# Patient Record
Sex: MALE | Race: WHITE | NOT HISPANIC OR LATINO | Employment: FULL TIME | URBAN - METROPOLITAN AREA
[De-identification: names, ages, dates, MRNs, and addresses within clinical notes are randomized per-mention and may not be internally consistent; named-entity substitution may affect disease eponyms.]

---

## 2023-03-24 ENCOUNTER — OFFICE VISIT (OUTPATIENT)
Dept: FAMILY MEDICINE | Facility: CLINIC | Age: 28
End: 2023-03-24
Payer: COMMERCIAL

## 2023-03-24 VITALS
HEART RATE: 69 BPM | DIASTOLIC BLOOD PRESSURE: 81 MMHG | RESPIRATION RATE: 16 BRPM | OXYGEN SATURATION: 97 % | TEMPERATURE: 97.5 F | BODY MASS INDEX: 26.81 KG/M2 | SYSTOLIC BLOOD PRESSURE: 136 MMHG | HEIGHT: 69 IN | WEIGHT: 181 LBS

## 2023-03-24 DIAGNOSIS — H91.90 HEARING LOSS, UNSPECIFIED HEARING LOSS TYPE, UNSPECIFIED LATERALITY: ICD-10-CM

## 2023-03-24 DIAGNOSIS — Z00.00 ROUTINE GENERAL MEDICAL EXAMINATION AT A HEALTH CARE FACILITY: Primary | ICD-10-CM

## 2023-03-24 DIAGNOSIS — M25.50 MULTIPLE JOINT PAIN: ICD-10-CM

## 2023-03-24 DIAGNOSIS — G89.29 CHRONIC BILATERAL LOW BACK PAIN, UNSPECIFIED WHETHER SCIATICA PRESENT: ICD-10-CM

## 2023-03-24 DIAGNOSIS — M54.50 CHRONIC BILATERAL LOW BACK PAIN, UNSPECIFIED WHETHER SCIATICA PRESENT: ICD-10-CM

## 2023-03-24 DIAGNOSIS — R40.0 HAS DAYTIME DROWSINESS: ICD-10-CM

## 2023-03-24 PROCEDURE — 99213 OFFICE O/P EST LOW 20 MIN: CPT | Mod: 25 | Performed by: PHYSICIAN ASSISTANT

## 2023-03-24 PROCEDURE — 99385 PREV VISIT NEW AGE 18-39: CPT | Performed by: PHYSICIAN ASSISTANT

## 2023-03-24 ASSESSMENT — ENCOUNTER SYMPTOMS
HEMATOCHEZIA: 0
CHILLS: 0
COUGH: 0
HEARTBURN: 0
SHORTNESS OF BREATH: 0
FEVER: 0
NERVOUS/ANXIOUS: 0
HEMATURIA: 0
NAUSEA: 0
DIZZINESS: 0
EYE PAIN: 0
DIARRHEA: 0
DYSURIA: 0
FREQUENCY: 0
PARESTHESIAS: 0
WEAKNESS: 0
HEADACHES: 0
ARTHRALGIAS: 1
ABDOMINAL PAIN: 0
CONSTIPATION: 0
JOINT SWELLING: 0
MYALGIAS: 0
PALPITATIONS: 0
SORE THROAT: 0

## 2023-03-24 ASSESSMENT — PAIN SCALES - GENERAL: PAINLEVEL: SEVERE PAIN (6)

## 2023-03-24 NOTE — PATIENT INSTRUCTIONS
Follow up with audiology and sleep center   Follow up with orthopedics for joint pain and low back pain     Patient Education    Preventive Health Recommendations  Male Ages 26 - 39    Yearly exam:             See your health care provider every year in order to  o   Review health changes.   o   Discuss preventive care.    o   Review your medicines if your doctor has prescribed any.  You should be tested each year for STDs (sexually transmitted diseases), if you re at risk.   After age 35, talk to your provider about cholesterol testing. If you are at risk for heart disease, have your cholesterol tested at least every 5 years.   If you are at risk for diabetes, you should have a diabetes test (fasting glucose).  Shots: Get a flu shot each year. Get a tetanus shot every 10 years.     Nutrition:  Eat at least 5 servings of fruits and vegetables daily.   Eat whole-grain bread, whole-wheat pasta and brown rice instead of white grains and rice.   Get adequate Calcium and Vitamin D.     Lifestyle  Exercise for at least 150 minutes a week (30 minutes a day, 5 days a week). This will help you control your weight and prevent disease.   Limit alcohol to one drink per day.   No smoking.   Wear sunscreen to prevent skin cancer.   See your dentist every six months for an exam and cleaning.

## 2023-03-24 NOTE — PROGRESS NOTES
Answers for HPI/ROS submitted by the patient on 3/24/2023  Frequency of exercise:: 6-7 days/week  Getting at least 3 servings of Calcium per day:: Yes  Diet:: Regular (no restrictions)  Taking medications regularly:: Not Applicable  Medication side effects:: Not applicable  Bi-annual eye exam:: Yes  Dental care twice a year:: Yes  Sleep apnea or symptoms of sleep apnea:: Daytime drowsiness  abdominal pain: No  Blood in stool: No  Blood in urine: No  chest pain: No  chills: No  congestion: No  constipation: No  cough: No  diarrhea: No  dizziness: No  ear pain: No  eye pain: No  nervous/anxious: No  fever: No  frequency: No  genital sores: No  headaches: No  hearing loss: Yes  heartburn: No  arthralgias: Yes  joint swelling: No  peripheral edema: No  mood changes: No  myalgias: No  nausea: No  dysuria: No  palpitations: No  Skin sensation changes: No  sore throat: No  urgency: No  rash: No  shortness of breath: No  visual disturbance: No  weakness: No  Additional concerns today:: No  Duration of exercise:: Greater than 60 minutes
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
## 2023-04-12 ENCOUNTER — MYC MEDICAL ADVICE (OUTPATIENT)
Dept: ORTHOPEDICS | Facility: CLINIC | Age: 28
End: 2023-04-12
Payer: COMMERCIAL

## 2023-04-12 ENCOUNTER — TELEPHONE (OUTPATIENT)
Dept: ORTHOPEDICS | Facility: CLINIC | Age: 28
End: 2023-04-12
Payer: COMMERCIAL

## 2023-04-12 NOTE — TELEPHONE ENCOUNTER
Call went straight to voicemail. Asked patient to return call or reply to [email or web address] message to discuss what we are able to evaluate tomorrow.     If patient returns call we are unable to see for back and neck pain, as our sports medicine providers do not treat chronic spine conditions. Would recommend he reach out to his PCP for a referral to spine.     Need more information on location of hip pain and when hip pain occurs. [facility].     Will send patient follow up [employer] message.     Alexandrea Montano MSA, ATC  Certified Athletic Trainer

## 2023-04-12 NOTE — TELEPHONE ENCOUNTER
Left voicemail for patient to return call to discuss what he is wanting to be seen for tomorrow. Follow up message sent via [employer].    Patient noted appointment for back, neck, hips, knees and shoulders. If patient calls back please clarify what his primary concern is as we generally focus on 1-2 joints per visit. He stated in scheduling appointment that it was not for his back, but then indicated it in the appointment note. Need additional clarification on length of back pain, as sports medicine is not appropriate for chronic back and neck pain.        Alexandrea Montano MSA, ATC  Certified Athletic Trainer

## 2023-04-12 NOTE — TELEPHONE ENCOUNTER
Message left for patient asking for call back or reply to [employer] message to address what we are able to evaluate tomorrow. Will try contacting patient in the morning.     Alexandrea Montano MSA, ATC  Certified Athletic Trainer

## 2023-04-12 NOTE — TELEPHONE ENCOUNTER
Patient called back- I relayed info below. His number one issues is hips, [facility], no prior surgery    Back & Neck pain has been for ~ 7 years and not sports realated    Shoulders, no imaging, no prior surgery  Knees, no imaging, no prior surgery

## 2023-04-13 ENCOUNTER — ANCILLARY PROCEDURE (OUTPATIENT)
Dept: GENERAL RADIOLOGY | Facility: CLINIC | Age: 28
End: 2023-04-13
Attending: FAMILY MEDICINE
Payer: COMMERCIAL

## 2023-04-13 ENCOUNTER — OFFICE VISIT (OUTPATIENT)
Dept: ORTHOPEDICS | Facility: CLINIC | Age: 28
End: 2023-04-13
Payer: COMMERCIAL

## 2023-04-13 VITALS
HEIGHT: 70 IN | WEIGHT: 186.6 LBS | BODY MASS INDEX: 26.71 KG/M2 | DIASTOLIC BLOOD PRESSURE: 93 MMHG | SYSTOLIC BLOOD PRESSURE: 136 MMHG

## 2023-04-13 DIAGNOSIS — M25.561 CHRONIC PAIN OF BOTH KNEES: Primary | ICD-10-CM

## 2023-04-13 DIAGNOSIS — M54.50 LUMBOSACRAL PAIN, CHRONIC: ICD-10-CM

## 2023-04-13 DIAGNOSIS — M25.50 MULTIPLE JOINT PAIN: ICD-10-CM

## 2023-04-13 DIAGNOSIS — M25.562 CHRONIC PAIN OF BOTH KNEES: Primary | ICD-10-CM

## 2023-04-13 DIAGNOSIS — M25.561 BILATERAL KNEE PAIN: ICD-10-CM

## 2023-04-13 DIAGNOSIS — M54.50 CHRONIC BILATERAL LOW BACK PAIN, UNSPECIFIED WHETHER SCIATICA PRESENT: ICD-10-CM

## 2023-04-13 DIAGNOSIS — M25.562 BILATERAL KNEE PAIN: ICD-10-CM

## 2023-04-13 DIAGNOSIS — G89.29 LUMBOSACRAL PAIN, CHRONIC: ICD-10-CM

## 2023-04-13 DIAGNOSIS — M25.551 BILATERAL HIP PAIN: ICD-10-CM

## 2023-04-13 DIAGNOSIS — M25.552 BILATERAL HIP PAIN: ICD-10-CM

## 2023-04-13 DIAGNOSIS — G89.29 CHRONIC BILATERAL LOW BACK PAIN, UNSPECIFIED WHETHER SCIATICA PRESENT: ICD-10-CM

## 2023-04-13 DIAGNOSIS — G89.29 CHRONIC PAIN OF BOTH KNEES: Primary | ICD-10-CM

## 2023-04-13 PROCEDURE — 72170 X-RAY EXAM OF PELVIS: CPT | Mod: TC | Performed by: RADIOLOGY

## 2023-04-13 PROCEDURE — 73562 X-RAY EXAM OF KNEE 3: CPT | Mod: TC | Performed by: RADIOLOGY

## 2023-04-13 PROCEDURE — 99204 OFFICE O/P NEW MOD 45 MIN: CPT | Performed by: FAMILY MEDICINE

## 2023-04-13 NOTE — TELEPHONE ENCOUNTER
Patient seen in clinic. Closing encounter.    Alexandrea Montano MSA, ATC  Certified Athletic Trainer

## 2023-04-13 NOTE — PATIENT INSTRUCTIONS
Thank you for choosing [facility]    [facility] LOCATIONS  [place] [address] [address] [facility] [facility], [address]th [place], [address] [place] [address]   [phone]       APPOINTMENTS: [phone]    CARE QUESTIONS: [phone],    BILLING QUESTIONS: [phone]    FAX NUMBER: [phone]        Follow up: once you know the date of your MRI please call [phone] to schedule a 40 minute follow up appointment to review the results, and for evaluation of your shoulder pain.       1. Chronic pain of both knees    2. Multiple joint pain    3. Chronic bilateral low back pain, unspecified whether sciatica present    4. Bilateral hip pain    5. Lumbosacral pain, chronic        An order for an MRI was placed today. You may call directly to schedule at [phone] at your convenience.   .

## 2023-04-13 NOTE — TELEPHONE ENCOUNTER
Phone again goes straight to voicemail. Will reach out via [facility] [employer]. Did provide call back number.     Alexandrea Montano MSA, ATC  Certified Athletic Trainer
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
## 2023-04-13 NOTE — TELEPHONE ENCOUNTER
Routing to appropriate pool to please review and advise, thank you!    Danae Briones RN  [facility]

## 2023-04-28 ENCOUNTER — ANCILLARY PROCEDURE (OUTPATIENT)
Dept: MRI IMAGING | Facility: CLINIC | Age: 28
End: 2023-04-28
Attending: FAMILY MEDICINE
Payer: COMMERCIAL

## 2023-04-28 DIAGNOSIS — G89.29 CHRONIC PAIN OF BOTH KNEES: ICD-10-CM

## 2023-04-28 DIAGNOSIS — M25.561 CHRONIC PAIN OF BOTH KNEES: ICD-10-CM

## 2023-04-28 DIAGNOSIS — M25.562 CHRONIC PAIN OF BOTH KNEES: ICD-10-CM

## 2023-04-28 PROCEDURE — 73721 MRI JNT OF LWR EXTRE W/O DYE: CPT | Mod: 50
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
## 2023-05-11 ENCOUNTER — OFFICE VISIT (OUTPATIENT)
Dept: PHYSICAL MEDICINE AND REHAB | Facility: CLINIC | Age: 28
End: 2023-05-11
Attending: FAMILY MEDICINE
Payer: COMMERCIAL

## 2023-05-11 VITALS
BODY MASS INDEX: 26.66 KG/M2 | DIASTOLIC BLOOD PRESSURE: 73 MMHG | HEART RATE: 67 BPM | WEIGHT: 185.8 LBS | SYSTOLIC BLOOD PRESSURE: 111 MMHG | OXYGEN SATURATION: 96 %

## 2023-05-11 DIAGNOSIS — M54.50 LUMBOSACRAL PAIN, CHRONIC: ICD-10-CM

## 2023-05-11 DIAGNOSIS — M53.3 SACROILIAC JOINT PAIN: ICD-10-CM

## 2023-05-11 DIAGNOSIS — G89.29 LUMBOSACRAL PAIN, CHRONIC: ICD-10-CM

## 2023-05-11 DIAGNOSIS — M53.3 SACROILIAC JOINT PAIN: Primary | ICD-10-CM

## 2023-05-11 DIAGNOSIS — M54.50 CHRONIC BILATERAL LOW BACK PAIN WITHOUT SCIATICA: Primary | ICD-10-CM

## 2023-05-11 DIAGNOSIS — G89.29 CHRONIC BILATERAL LOW BACK PAIN WITHOUT SCIATICA: Primary | ICD-10-CM

## 2023-05-11 PROCEDURE — 99205 OFFICE O/P NEW HI 60 MIN: CPT | Performed by: NURSE PRACTITIONER

## 2023-05-11 ASSESSMENT — PAIN SCALES - GENERAL: PAINLEVEL: SEVERE PAIN (6)

## 2023-05-11 NOTE — LETTER
[facility] PHYSICAL MEDICINE AND REHABILITATION CLINIC [place] [address] [address]  Phone: [phone]  Fax: [phone]    May 11, 2023        Eb León  [address] [address] [place] [id number]          To whom it may concern:    RE: Eb León was seen in our clinic today.  I would recommend that he use a standing desk for his work.    Please contact me for questions or concerns.      Sincerely,        Kiley Keith NP  Physical Medicine and Rehabilitation, Medical Spine  PM&R clinic Phone: [phone]  PM&R clinic Fax: [phone]

## 2023-05-11 NOTE — PATIENT INSTRUCTIONS
It was a pleasure seeing you in the clinic today.  As discussed, we made the following updates to your plan of care:       An XR of your lumbar spine order was added today.  Radiology will call you to schedule. You may also call [facility] Imaging Scheduling directly if you do not hear from them in the next couple of days.    Imaging scheduling:  [facility] [phone] Clinics and Surgery Center [facility]), [facility] Clinics, including [facility], [facility] [phone] [facility], [facility] Clinics including [place], [place], [place], [place], and [facility] [phone] [facility], [facility], [facility], [facility] clinics, including [place], [facility], and [facility] [phone] [facility], [facility], [facility] Clinics, including [place], [facility], [facility], and [place]     An order for physical therapy was added today. Physical therapy schedulers should call you in the next few days to schedule an appointment. You may also call [phone] to arrange an appointment at any of the [facility] outpatient physical therapy locations.  It will be very important for you to do the physical therapy exercises on a regular basis to decrease your pain and prevent future flares of pain.     I will add the order for bilateral SI joint injections with Dr Day.  Our schedulers will reach out to you to arrange that appointment.    I added a work letter for a standing desk which you should be able to access through [facility].          Thank you,  Kiley Ketih NP  Physical Medicine and Rehabilitation, Medical Spine  PM&R clinic Phone: [phone]  PM&R clinic Fax: [phone]

## 2023-05-11 NOTE — LETTER
"5/11/2023       RE: Eb León  [id number] [address] [place] [id number]       Dear Colleague,    Thank you for referring your patient, Eb León, to the [facility] PHYSICAL MEDICINE AND REHABILITATION CLINIC [place] at [facility]. Please see a copy of my visit note below.    Patient seen at the request of Pedro Ortega for an opinion and evaluation of chronic low back pain.      HISTORY OF PRESENT ILLNESS:  Eb León is a 27 year old male who presents with a chief complaint of chronic low back pain.     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  The first accident happened in [place] in 2017 when he was helping to unload cargo off of a trailer truck.  He had a misstep and fell off the trailer.  He had a similar incident falling off of a trailer truck from a 4-5 foot height in 2020 when [place] was [place] in [place].     He says that he [facility] about [facility].  He said that they had discussed surgical intervention however he says no imaging was ever done.  He says that he did do some physical therapy in 2019 when he was stationed working in [place].    He reports some pain at the low back \"where the pelvis and spine meet\".  He reports a pinching, throbbing, constant pain.  The pain extends up his spine.  He says he cannot tell if it is more of the spine or muscles around it that are bothering him. He denies any pain, numbness or tingling in his legs or feet.    The pain has been present since his initial fall in 2017.  The pain flared up after the second fall in 2020.  The pain is fairly constant day-to-day. Today he rates the pain 6/10.  At its worst it is 8 8-9/10.  Heat and ibuprofen helps the pain temporarily.  He also tried cold, stretching, and strengthening.  He says that he loves golfing and exercise but golf and twisting movements flares up the pain.     He denies falls, " weakness, bowel or bladder incontinence, saddle anesthesia, unintentional weight loss, fevers/chills, or drenching night sweats.    He did see Dr. Ortega back in sports medicine.  He says that they discussed possibly using a brace for his right knee during activity and potentially an injection for the right knee.  He says he is holding off on that for right now because he would like to address his back first.      PRIOR INJURIES/TREATMENT:   Ice/Heat: heat, helps temporarily    Brace: no    Physical Therapy:   PT last was in [place] end of 2019      - Current Pain Medications -   Ibuprofen as needed      - Prior/Trialed Pain Medications -   biofreeze  KT tape    Prior Procedures:  Date    Procedure   Improvement (%)  none              Prior Related Surgery: none    Other (acupuncture, OMT, CMM, TENS, DME, etc.):   Chiropractor in the past helped with the hip pain    Specialists Seen - (with most recent, available notes and clinic visits reviewed)   1. Sports medicine-Dr. Ortega    IMAGING - reviewed   MR KNEE LEFT W/O CONTRAST  4/28/2023   IMPRESSION:  1.  Questionable medial meniscal surface irregularity and intrasubstance signal, is seen on a single sequence but on none of the other sequences. This is favored to be artifact. However, as some meniscal tears can be subtle on MRI, I would recommend   correlation with medial meniscal stress testing and/or symptomatology. If clinical findings are equivocal, or if it would otherwise , MRI or CT arthrography could be considered for further sensitivity/specificity.     2.  No other significant intra-articular pathology. The cartilage surfaces are intact and well preserved. The lateral meniscus is normal.     3.  Moderate lateral patellar tilting might predispose to patellofemoral maltracking. However, the patellofemoral cartilage is normal. Mild proximal patellar tendinosis and peritendinous edema might correlate with symptoms of tendinitis.     4.  Mild  distal semimembranosus tendinosis without tearing.     5.  Otherwise normal left knee.    MR KNEE RIGHT W/O CONTRAST 4/28/2023   IMPRESSION:  1.  Mild proximal patellar tendinosis, without tearing or significant peritendinous edema.     2.  Mild lateral patellar tilting, might predispose to patellofemoral maltracking. Patellofemoral cartilage is intact and is normal.     3.  Mild distal semimembranosus tendinosis without tearing or acute tendinitis.     4.  Otherwise normal right knee. The menisci and cartilage surfaces are intact. Ligaments and myotendinous structures are otherwise unremarkable.    PELVIS ONE TO TWO VIEWS   4/13/2023                                                                 IMPRESSION: No acute displaced pelvic or proximal femoral fracture  identified. Normal and symmetric hip joint spaces. Diminished  bilateral femoral head-neck offset. Both obturator rings are intact.  Symmetric SI joints.      Review Of Systems:  I am responding to those symptoms which are directly relevant to the specific indication for my consultation. I recommend that the patient follow up with their primary or referring provider to pursue any other symptoms which may be of concern.       Medical History:  Chronic back pain    He  has a past surgical history that includes no history of surgery.    Family History  His family history includes Breast Cancer in his maternal aunt; Cancer in his maternal grandmother, paternal grandfather, and paternal grandmother; Diabetes in his father.     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  No difficulty with ADLs/IADLs  He  reports that he has never smoked. He has never been exposed to tobacco smoke. He has never used smokeless tobacco. He reports current alcohol use. He reports that he does not use drugs.        Current Medications:   He currently has no medications in their medication list.     Allergies:   No Known  Allergies    PHYSICAL EXAMINATION:  There were no vitals taken for this visit.   --CONSTITUTIONAL: Vital signs as above. No acute distress. The patient is well nourished and well groomed.  --PSYCHIATRIC: The patient is awake, alert, oriented to person, place, time and answering questions appropriately with clear speech. Appropriate mood and affect   --SKIN:  Posterior torso is clean, dry, intact without rashes.   --RESPIRATORY: Normal rhythm and effort. No abnormal accessory muscle breathing patterns noted.   --GROSS MOTOR: Easily arises from a seated position. Toe walking and heel walking are normal.  Heel-to-toe walking is normal.  --STANDING EXAMINATION: Gait is non-antalgic. Normal lumbar lordosis noted, no lateral shift.  --LOWER EXTREMITY MOTOR TESTING:  Plantar flexion left 5/5, right 5/5   Dorsiflexion left 5/5, right 5/5   Great toe MTP extension left 5/5, right 5/5  Knee flexion left 5/5, right 5/5  Knee extension left 5/5, right 5/5   Hip flexion left 5/5, right 5/5  Ankle eversion and inversion left 5/5, right 5/5  --MUSCULOSKELETAL: Lumbar spine inspection reveals no evidence of deformity. Range of motion is not limited in lumbar flexion, extension, lateral rotation.  Lateral rotation elicits pain.  No tenderness to palpation lumbar spine. Straight leg raise in the supine position is negative to radicular pain.  Facet loading maneuvers are positive bilaterally.  Pain with palpation of the midline spine, of the whole lumbar spine especially more so at L4/L5 area.   There is paraspinal muscular fullness.  --SACROILIAC JOINT: Will positive.  There is pain with SI joint palpation left more than right. Negative distraction.  Aaron's positive bilaterally.  Positive Gaenslen's Test.  Sacroiliac Joint Compression Test negative. Sacral Thrust/Yeoman's Test positive.  --HIPS: Full range of motion bilaterally.  Mild pain with logrolling bilaterally.     --NEUROLOGIC: 2/4 patellar, medial hamstring, and achilles  reflexes bilaterally.  Sensation to light touch is intact in the bilateral L4, L5, and S1 dermatomes. No clonus.    --VASCULAR:  Warm lower limbs bilaterally. There is no pitting edema of the bilateral lower extremities.     ASSESSMENT:  Eb León is a pleasant 27 year old male who presents with chronic low back pain.  Physical exam with positive SI joint maneuvers, facet loading maneuvers, and hip log rolling.  His strength and reflexes are normal.  I suspect that his pain is related to some combination of SI joint and axial/mechanical low back pain, possible hip pain.      PELVIS XR  4/13/2023 shows no acute fracture or dislocation. Does show diminished  bilateral femoral head-neck offset. Symmetric SI joints.    PLAN:  -Imaging reviewed in detail  -Add x-ray of the lumbar spine  -Add order for physical therapy  -Letter provided for standing desk  -We discussed medication management such as muscle relaxer for the muscle spasm component of his pain but he prefers to hold off on any medications at this point  -Refer for bilateral SI joint injection with Dr. Day  - RTC for SI joint injection with Dr. Day    Ready to learn, no apparent learning barriers.  Education provided on treatment plan according to patient's preferred learning style.  Patient verbalizes understanding.   __________________________________      60 minutes spent by me on the date of the encounter doing chart review, history and exam, documentation and further activities per the note         Again, thank you for allowing me to participate in the care of your patient.      Sincerely,    MAY Morrow CNP

## 2023-05-12 ENCOUNTER — TELEPHONE (OUTPATIENT)
Dept: PHYSICAL MEDICINE AND REHAB | Facility: CLINIC | Age: 28
End: 2023-05-12
Payer: COMMERCIAL

## 2023-05-12 NOTE — TELEPHONE ENCOUNTER
Left voicemail for patient regarding scheduling surgery with Dr. Day.    Provided contact number to discuss.    P: [phone]    __    Elsi Harry, on 5/12/2023 at 11:13 AM

## 2023-05-16 PROBLEM — M54.50 LUMBOSACRAL PAIN, CHRONIC: Status: ACTIVE | Noted: 2023-05-16

## 2023-05-16 PROBLEM — G89.29 LUMBOSACRAL PAIN, CHRONIC: Status: ACTIVE | Noted: 2023-05-16

## 2023-05-16 PROBLEM — M53.3 SACROILIAC JOINT PAIN: Status: ACTIVE | Noted: 2023-05-16

## 2023-05-16 NOTE — TELEPHONE ENCOUNTER
Writer attempted to reach patient after receiving voicemail. Left detailed message with callback number [phone].    Behzad eClis on 5/16/2023 at 10:42 AM

## 2023-05-17 ENCOUNTER — TELEPHONE (OUTPATIENT)
Dept: PHYSICAL MEDICINE AND REHAB | Facility: CLINIC | Age: 28
End: 2023-05-17
Payer: COMMERCIAL

## 2023-05-22 ENCOUNTER — TELEPHONE (OUTPATIENT)
Dept: PHYSICAL MEDICINE AND REHAB | Facility: CLINIC | Age: 28
End: 2023-05-22
Payer: COMMERCIAL

## 2023-05-25 ENCOUNTER — TELEPHONE (OUTPATIENT)
Dept: PHYSICAL MEDICINE AND REHAB | Facility: CLINIC | Age: 28
End: 2023-05-25
Payer: COMMERCIAL

## 2023-05-25 NOTE — TELEPHONE ENCOUNTER
Unable to leave   To schedule with  mailbox is full    Anna C. Schoenecker on 5/25/2023 at 12:34 PM

## 2023-06-05 ENCOUNTER — OFFICE VISIT (OUTPATIENT)
Dept: AUDIOLOGY | Facility: CLINIC | Age: 28
End: 2023-06-05
Payer: COMMERCIAL

## 2023-06-05 DIAGNOSIS — H90.3 SENSORINEURAL HEARING LOSS (SNHL) OF BOTH EARS: Primary | ICD-10-CM

## 2023-06-05 DIAGNOSIS — H91.90 HEARING LOSS, UNSPECIFIED HEARING LOSS TYPE, UNSPECIFIED LATERALITY: ICD-10-CM

## 2023-06-05 PROCEDURE — 92557 COMPREHENSIVE HEARING TEST: CPT | Performed by: AUDIOLOGIST

## 2023-06-05 PROCEDURE — 92550 TYMPANOMETRY & REFLEX THRESH: CPT | Performed by: AUDIOLOGIST

## 2023-06-05 NOTE — PROGRESS NOTES
AUDIOLOGY REPORT    SUBJECTIVE:  Eb León is a 27 year old male who was seen in the Audiology Clinic at the [facility] for audiologic evaluation, referred by Payton Goodman PA-C  The patient reports a bilateral hearing loss and tinnitus that he suspects is related to noise exposure in the . The patient denies other ear-related symptoms or concerns.  The patient notes difficulty with communication in a variety of listening situations.     OBJECTIVE:    Fall Risk Screen:  1. Have you fallen two or more times in the past year? No  2. Have you fallen and had an injury in the past year? No      Otoscopic exam indicates ears are clear of cerumen bilaterally     Pure Tone Thresholds assessed using conventional audiometry with good  reliability from 250-8000 Hz bilaterally using insert earphones     RIGHT:  normal sloping to mild sensorineural hearing loss rising back to normal    LEFT:    normal sloping to mild sensorineural hearing loss rising back to normal    Tympanogram:    RIGHT: normal eardrum mobility    LEFT:   normal eardrum mobility    Reflexes (reported by stimulus ear):  RIGHT: Ipsilateral is present at normal levels  RIGHT: Contralateral is present at normal levels  LEFT:   Ipsilateral is present at normal levels  LEFT:   Contralateral is present at normal levels      Speech Reception Threshold:    RIGHT: 10 dB HL    LEFT:   10 dB HL  Word Recognition Score:     RIGHT: 100% at 50 dB HL using NU-6 recorded word list.    LEFT:   100% at 50 dB HL using NU-6 recorded word list.      ASSESSMENT:     ICD-10-CM    1. Sensorineural hearing loss (SNHL) of both ears  H90.3 Cmprhn Audiometry Thrshld Eval & Speech Recog (48482)     Tymps / Reflex   (79394)      2. Hearing loss, unspecified hearing loss type, unspecified laterality  H91.90 Adult Audiology  Referral          Today s results were discussed with the patient in detail.     PLAN:  Patient was counseled  regarding hearing loss and impact on communication. It is recommended that the patient be retested in approximately 1 year or sooner if new concerns arise.  Please call this clinic with questions regarding these results or recommendations.        Peter Xavier.  Doctor of Audiology  [place] License # [id number]

## 2023-10-23 ASSESSMENT — ANXIETY QUESTIONNAIRES
3. WORRYING TOO MUCH ABOUT DIFFERENT THINGS: SEVERAL DAYS
GAD7 TOTAL SCORE: 7
IF YOU CHECKED OFF ANY PROBLEMS ON THIS QUESTIONNAIRE, HOW DIFFICULT HAVE THESE PROBLEMS MADE IT FOR YOU TO DO YOUR WORK, TAKE CARE OF THINGS AT HOME, OR GET ALONG WITH OTHER PEOPLE: SOMEWHAT DIFFICULT
1. FEELING NERVOUS, ANXIOUS, OR ON EDGE: SEVERAL DAYS
4. TROUBLE RELAXING: SEVERAL DAYS
5. BEING SO RESTLESS THAT IT IS HARD TO SIT STILL: SEVERAL DAYS
2. NOT BEING ABLE TO STOP OR CONTROL WORRYING: SEVERAL DAYS
7. FEELING AFRAID AS IF SOMETHING AWFUL MIGHT HAPPEN: SEVERAL DAYS
GAD7 TOTAL SCORE: 7
6. BECOMING EASILY ANNOYED OR IRRITABLE: SEVERAL DAYS

## 2023-10-23 ASSESSMENT — PATIENT HEALTH QUESTIONNAIRE - PHQ9
SUM OF ALL RESPONSES TO PHQ QUESTIONS 1-9: 7
10. IF YOU CHECKED OFF ANY PROBLEMS, HOW DIFFICULT HAVE THESE PROBLEMS MADE IT FOR YOU TO DO YOUR WORK, TAKE CARE OF THINGS AT HOME, OR GET ALONG WITH OTHER PEOPLE: NOT DIFFICULT AT ALL
SUM OF ALL RESPONSES TO PHQ QUESTIONS 1-9: 7

## 2023-10-24 ENCOUNTER — VIRTUAL VISIT (OUTPATIENT)
Dept: PSYCHOLOGY | Facility: CLINIC | Age: 28
End: 2023-10-24
Payer: COMMERCIAL

## 2023-10-24 DIAGNOSIS — F34.1 DYSTHYMIA: ICD-10-CM

## 2023-10-24 DIAGNOSIS — F43.9 TRAUMA AND STRESSOR-RELATED DISORDER: ICD-10-CM

## 2023-10-24 DIAGNOSIS — Z63.4 BEREAVEMENT: ICD-10-CM

## 2023-10-24 DIAGNOSIS — F33.1 MODERATE RECURRENT MAJOR DEPRESSION (H): Primary | ICD-10-CM

## 2023-10-24 DIAGNOSIS — F90.2 ATTENTION DEFICIT HYPERACTIVITY DISORDER (ADHD), COMBINED TYPE: ICD-10-CM

## 2023-10-24 PROCEDURE — 90791 PSYCH DIAGNOSTIC EVALUATION: CPT | Mod: 95 | Performed by: SOCIAL WORKER

## 2023-10-24 SDOH — SOCIAL STABILITY - SOCIAL INSECURITY: DISSAPEARANCE AND DEATH OF FAMILY MEMBER: Z63.4

## 2023-10-29 PROBLEM — F90.2 ATTENTION DEFICIT HYPERACTIVITY DISORDER (ADHD), COMBINED TYPE: Status: ACTIVE | Noted: 2023-10-29

## 2023-10-29 PROBLEM — F43.9 TRAUMA AND STRESSOR-RELATED DISORDER: Status: ACTIVE | Noted: 2023-10-29

## 2023-10-29 PROBLEM — F33.1 MODERATE RECURRENT MAJOR DEPRESSION (H): Status: ACTIVE | Noted: 2023-10-29

## 2023-10-29 PROBLEM — Z63.4 BEREAVEMENT: Status: ACTIVE | Noted: 2023-10-29

## 2023-10-29 PROBLEM — F34.1 DYSTHYMIA: Status: ACTIVE | Noted: 2023-10-29

## 2023-10-29 ASSESSMENT — COLUMBIA-SUICIDE SEVERITY RATING SCALE - C-SSRS
ATTEMPT LIFETIME: NO
1. HAVE YOU WISHED YOU WERE DEAD OR WISHED YOU COULD GO TO SLEEP AND NOT WAKE UP?: YES
4. HAVE YOU HAD THESE THOUGHTS AND HAD SOME INTENTION OF ACTING ON THEM?: NO
2. HAVE YOU ACTUALLY HAD ANY THOUGHTS OF KILLING YOURSELF?: NO
REASONS FOR IDEATION LIFETIME: MOSTLY TO END OR STOP THE PAIN (YOU COULDN'T GO ON LIVING WITH THE PAIN OR HOW YOU WERE FEELING)
6. HAVE YOU EVER DONE ANYTHING, STARTED TO DO ANYTHING, OR PREPARED TO DO ANYTHING TO END YOUR LIFE?: NO
1. IN THE PAST MONTH, HAVE YOU WISHED YOU WERE DEAD OR WISHED YOU COULD GO TO SLEEP AND NOT WAKE UP?: NO
5. HAVE YOU STARTED TO WORK OUT OR WORKED OUT THE DETAILS OF HOW TO KILL YOURSELF? DO YOU INTEND TO CARRY OUT THIS PLAN?: NO
REASONS FOR IDEATION PAST MONTH: DOES NOT APPLY
TOTAL  NUMBER OF ABORTED OR SELF INTERRUPTED ATTEMPTS LIFETIME: NO
3. HAVE YOU BEEN THINKING ABOUT HOW YOU MIGHT KILL YOURSELF?: NO
2. HAVE YOU ACTUALLY HAD ANY THOUGHTS OF KILLING YOURSELF?: YES
TOTAL  NUMBER OF INTERRUPTED ATTEMPTS LIFETIME: NO

## 2023-10-29 NOTE — PROGRESS NOTES
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 professionals: with medical team if needed      6.  Referrals:   The following referral(s) will be initiated: [facility]. Next Scheduled Appointment: therapist will look for therapist on team with availability. This therapists schedule is booked out too far.   Client referred for medication evaluation, but not interested at this time.      A Release of Information has been obtained for the following:  not at this time .     Clinical Substantiation/medical necessity for the above recommendations:  depression,ADHD, reactivity, rigid eating can get in way of relationship and daily living.    7. JENNIFER:    JENNIFER:  Discussed the general effects of drugs and alcohol on health and well-being. Provider will email patient printed information about the  effects of chemical use on their health and well being. Recommendations:  continue limited use .     8. Records:   These were reviewed at time of assessment.   Information in this assessment was obtained from the medical record and  provided by patient who is a good historian.    Patient will have open access to their  mental health medical record.    9.   Interactive Complexity: Yes, visit entailed Interactive Complexity evidenced by:    10. Safety Plan:   client denying any safety issues. Call 911 or crisis line if needed.    Provider Name/ Credentials:  Fany Kearney LICSW LMFT  October 24, 2023

## 2023-10-29 NOTE — PATIENT INSTRUCTIONS
Therapist will explore therapists that could meet with client, as this therapist is booked our too far.

## 2023-12-23 NOTE — TELEPHONE ENCOUNTER
Left voicemail for patient indicating we would not be able to see him for all issues he mentioned in his scheduling comments.     Alexandrea Montano MSA, ATC  Certified Athletic Trainer   operating room 1/operating room

## 2024-01-12 ENCOUNTER — TELEPHONE (OUTPATIENT)
Dept: PSYCHOLOGY | Facility: CLINIC | Age: 29
End: 2024-01-12

## 2024-05-19 ENCOUNTER — HEALTH MAINTENANCE LETTER (OUTPATIENT)
Age: 29
End: 2024-05-19

## 2024-05-27 ENCOUNTER — TELEPHONE (OUTPATIENT)
Dept: PSYCHOLOGY | Facility: CLINIC | Age: 29
End: 2024-05-27

## 2024-05-27 ENCOUNTER — DOCUMENTATION ONLY (OUTPATIENT)
Dept: PSYCHOLOGY | Facility: CLINIC | Age: 29
End: 2024-05-27

## 2024-05-27 NOTE — PROGRESS NOTES
Discharge Summary  Single Session    Client Name: Eb León MRN#: [id number] YOB: 1995      Intake / Discharge Date:   Intake 10/24/2023      DSM5 Diagnoses: (Sustained by DSM5 Criteria Listed Above)  Diagnoses:   Principal DSM5 Diagnoses  (Sustained by DSM5 Criteria Listed Above):   Attention-Deficit/Hyperactivity Disorder  314.01 (F90.2) Combined presentation  296.32 (F33.1) Major Depressive Disorder, Recurrent Episode, Moderate _ and With mixed features  309.9 (F43.9) Unspecified Trauma and Stressor Related Disorder.  F34.1Dysthymia  Z63.4Bereavement  Rule out: PTSD  Psychosocial & Contextual Factors: loss of best friend      fathers health   Moving in with partner  Pain issues  focus  PROMIS-10 Scores        10/23/2023     8:55 AM   PROMIS-10 Total Score w/o Sub Scores   PROMIS TOTAL - SUBSCORES 27    27              Presenting Concern:  Anxiety  Focus   losses      Reason for Discharge:  [facility]erred to [facility]      Disposition at Time of Last Encounter:   Comments:   anxious     Risk Management:   Client has had a history of suicidal ideation: in high school  Recommended that patient call 911 or go to the local ED should there be a change in any of these risk factors.      Referred To:  [facility] for therapy for earlier start date  PCP    Check in email sent 5/27/2024        SY Linder   5/27/2024

## 2024-09-22 SDOH — HEALTH STABILITY: PHYSICAL HEALTH: ON AVERAGE, HOW MANY MINUTES DO YOU ENGAGE IN EXERCISE AT THIS LEVEL?: 50 MIN

## 2024-09-22 SDOH — HEALTH STABILITY: PHYSICAL HEALTH: ON AVERAGE, HOW MANY DAYS PER WEEK DO YOU ENGAGE IN MODERATE TO STRENUOUS EXERCISE (LIKE A BRISK WALK)?: 5 DAYS

## 2024-09-22 ASSESSMENT — SOCIAL DETERMINANTS OF HEALTH (SDOH): HOW OFTEN DO YOU GET TOGETHER WITH FRIENDS OR RELATIVES?: ONCE A WEEK

## 2024-09-27 ENCOUNTER — OFFICE VISIT (OUTPATIENT)
Dept: FAMILY MEDICINE | Facility: CLINIC | Age: 29
End: 2024-09-27
Payer: COMMERCIAL

## 2024-09-27 VITALS
WEIGHT: 191.2 LBS | DIASTOLIC BLOOD PRESSURE: 82 MMHG | RESPIRATION RATE: 13 BRPM | HEIGHT: 70 IN | SYSTOLIC BLOOD PRESSURE: 144 MMHG | BODY MASS INDEX: 27.37 KG/M2 | OXYGEN SATURATION: 98 % | TEMPERATURE: 98.1 F | HEART RATE: 65 BPM

## 2024-09-27 DIAGNOSIS — R03.0 ELEVATED BLOOD PRESSURE READING WITHOUT DIAGNOSIS OF HYPERTENSION: ICD-10-CM

## 2024-09-27 DIAGNOSIS — F90.0 ATTENTION DEFICIT HYPERACTIVITY DISORDER (ADHD), PREDOMINANTLY INATTENTIVE TYPE: ICD-10-CM

## 2024-09-27 DIAGNOSIS — F41.9 ANXIETY: ICD-10-CM

## 2024-09-27 DIAGNOSIS — F51.01 PRIMARY INSOMNIA: Primary | ICD-10-CM

## 2024-09-27 PROCEDURE — 99214 OFFICE O/P EST MOD 30 MIN: CPT

## 2024-09-27 ASSESSMENT — PATIENT HEALTH QUESTIONNAIRE - PHQ9
SUM OF ALL RESPONSES TO PHQ QUESTIONS 1-9: 10
SUM OF ALL RESPONSES TO PHQ QUESTIONS 1-9: 10
10. IF YOU CHECKED OFF ANY PROBLEMS, HOW DIFFICULT HAVE THESE PROBLEMS MADE IT FOR YOU TO DO YOUR WORK, TAKE CARE OF THINGS AT HOME, OR GET ALONG WITH OTHER PEOPLE: SOMEWHAT DIFFICULT

## 2024-09-30 RX ORDER — BUPROPION HYDROCHLORIDE 300 MG/1
300 TABLET ORAL EVERY MORNING
Qty: 90 TABLET | Refills: 1 | Status: SHIPPED | OUTPATIENT
Start: 2024-09-30 | End: 2025-03-29

## 2025-02-05 ENCOUNTER — MYC REFILL (OUTPATIENT)
Dept: FAMILY MEDICINE | Facility: CLINIC | Age: 30
End: 2025-02-05
Payer: COMMERCIAL

## 2025-02-05 DIAGNOSIS — R53.83 OTHER FATIGUE: ICD-10-CM

## 2025-02-05 RX ORDER — TESTOSTERONE CYPIONATE 200 MG/ML
50 INJECTION, SOLUTION INTRAMUSCULAR WEEKLY
Qty: 2 ML | Refills: 0 | Status: SHIPPED | OUTPATIENT
Start: 2025-02-05

## 2025-02-14 ENCOUNTER — MYC MEDICAL ADVICE (OUTPATIENT)
Dept: FAMILY MEDICINE | Facility: CLINIC | Age: 30
End: 2025-02-14
Payer: COMMERCIAL

## 2025-02-14 DIAGNOSIS — R53.83 OTHER FATIGUE: ICD-10-CM

## 2025-02-15 ENCOUNTER — LAB (OUTPATIENT)
Dept: LAB | Facility: CLINIC | Age: 30
End: 2025-02-15
Payer: COMMERCIAL

## 2025-02-15 DIAGNOSIS — L64.9 MALE PATTERN ALOPECIA: ICD-10-CM

## 2025-02-15 LAB — SHBG SERPL-SCNC: 7 NMOL/L (ref 11–80)

## 2025-02-15 PROCEDURE — 84270 ASSAY OF SEX HORMONE GLOBUL: CPT

## 2025-02-15 PROCEDURE — 36415 COLL VENOUS BLD VENIPUNCTURE: CPT

## 2025-02-15 PROCEDURE — 84403 ASSAY OF TOTAL TESTOSTERONE: CPT

## 2025-02-19 LAB
TESTOST FREE SERPL-MCNC: 2.34 NG/DL
TESTOST SERPL-MCNC: 70 NG/DL (ref 240–950)

## 2025-02-20 RX ORDER — TESTOSTERONE CYPIONATE 200 MG/ML
50 INJECTION, SOLUTION INTRAMUSCULAR WEEKLY
Qty: 2 ML | Refills: 0 | Status: SHIPPED | OUTPATIENT
Start: 2025-02-20

## 2025-02-21 ENCOUNTER — MYC MEDICAL ADVICE (OUTPATIENT)
Dept: FAMILY MEDICINE | Facility: CLINIC | Age: 30
End: 2025-02-21
Payer: COMMERCIAL

## 2025-02-27 ENCOUNTER — TELEPHONE (OUTPATIENT)
Dept: FAMILY MEDICINE | Facility: CLINIC | Age: 30
End: 2025-02-27
Payer: COMMERCIAL

## 2025-02-27 NOTE — TELEPHONE ENCOUNTER
General Call    Contacts       Contact Date/Time Type Contact Phone/Fax    02/27/2025 03:32 PM CST Phone (Incoming) MauPatrice mooney (Self) [phone] (H)          Reason for Call:  Appeal to [place]    What are your questions or concerns:  Pt called inquiring about appeal.  TC printed PCP's letter and spoke to [facility].  Faxing over PCP's letter to [facility] at [phone]    Tim Castellano

## 2025-03-31 ENCOUNTER — PATIENT OUTREACH (OUTPATIENT)
Dept: CARE COORDINATION | Facility: CLINIC | Age: 30
End: 2025-03-31
Payer: COMMERCIAL

## 2025-05-03 ENCOUNTER — MYC REFILL (OUTPATIENT)
Dept: FAMILY MEDICINE | Facility: CLINIC | Age: 30
End: 2025-05-03
Payer: COMMERCIAL

## 2025-05-03 DIAGNOSIS — R53.83 OTHER FATIGUE: ICD-10-CM

## 2025-05-05 RX ORDER — TESTOSTERONE CYPIONATE 200 MG/ML
100 INJECTION, SOLUTION INTRAMUSCULAR WEEKLY
Qty: 4 ML | Refills: 5 | Status: SHIPPED | OUTPATIENT
Start: 2025-05-05

## 2025-05-23 ENCOUNTER — MYC REFILL (OUTPATIENT)
Dept: FAMILY MEDICINE | Facility: CLINIC | Age: 30
End: 2025-05-23
Payer: COMMERCIAL

## 2025-05-23 DIAGNOSIS — R53.83 OTHER FATIGUE: ICD-10-CM

## 2025-05-27 RX ORDER — TESTOSTERONE CYPIONATE 200 MG/ML
100 INJECTION, SOLUTION INTRAMUSCULAR WEEKLY
Qty: 4 ML | Refills: 5 | OUTPATIENT
Start: 2025-05-27

## 2025-05-27 NOTE — TELEPHONE ENCOUNTER
#1 Attempted to contact patient. No Answer. LMTCB    Testosterone was sent on 5/5/25 with refill. Pt should have refill on needles as well if using once weekly.     Andre ROE RN

## 2025-05-27 NOTE — TELEPHONE ENCOUNTER
It looks like Dr. Gee sent this in with 5 refills on 5/5. Should be able to get refill from the pharmacy.

## 2025-05-28 NOTE — TELEPHONE ENCOUNTER
Outgoing call to patient, he last gave himself his injection on 5/24. He currently does not have any for 5/31 to do his weekly injection but has not reached out to pharmacy. He will contact pharmacy and let us know if he has any issues with his refill.

## 2025-06-08 ENCOUNTER — HEALTH MAINTENANCE LETTER (OUTPATIENT)
Age: 30
End: 2025-06-08